# Patient Record
Sex: MALE | Race: OTHER | URBAN - METROPOLITAN AREA
[De-identification: names, ages, dates, MRNs, and addresses within clinical notes are randomized per-mention and may not be internally consistent; named-entity substitution may affect disease eponyms.]

---

## 2017-09-03 ENCOUNTER — EMERGENCY (EMERGENCY)
Facility: HOSPITAL | Age: 47
LOS: 1 days | Discharge: HOME | End: 2017-09-03
Attending: EMERGENCY MEDICINE | Admitting: EMERGENCY MEDICINE
Payer: COMMERCIAL

## 2017-09-03 VITALS
RESPIRATION RATE: 18 BRPM | TEMPERATURE: 98 F | OXYGEN SATURATION: 100 % | HEART RATE: 85 BPM | DIASTOLIC BLOOD PRESSURE: 82 MMHG | SYSTOLIC BLOOD PRESSURE: 122 MMHG

## 2017-09-03 VITALS
HEART RATE: 84 BPM | TEMPERATURE: 98 F | RESPIRATION RATE: 18 BRPM | WEIGHT: 205.03 LBS | OXYGEN SATURATION: 98 % | SYSTOLIC BLOOD PRESSURE: 138 MMHG | HEIGHT: 70 IN | DIASTOLIC BLOOD PRESSURE: 88 MMHG

## 2017-09-03 DIAGNOSIS — R10.84 GENERALIZED ABDOMINAL PAIN: ICD-10-CM

## 2017-09-03 DIAGNOSIS — Z88.8 ALLERGY STATUS TO OTHER DRUGS, MEDICAMENTS AND BIOLOGICAL SUBSTANCES: ICD-10-CM

## 2017-09-03 DIAGNOSIS — Z88.1 ALLERGY STATUS TO OTHER ANTIBIOTIC AGENTS STATUS: ICD-10-CM

## 2017-09-03 LAB
ALBUMIN SERPL ELPH-MCNC: 4.5 G/DL — SIGNIFICANT CHANGE UP (ref 3.3–5)
ALP SERPL-CCNC: 65 U/L — SIGNIFICANT CHANGE UP (ref 40–120)
ALT FLD-CCNC: 16 U/L — SIGNIFICANT CHANGE UP (ref 10–45)
ANION GAP SERPL CALC-SCNC: 14 MMOL/L — SIGNIFICANT CHANGE UP (ref 5–17)
AST SERPL-CCNC: 19 U/L — SIGNIFICANT CHANGE UP (ref 10–40)
BASOPHILS NFR BLD AUTO: 0.3 % — SIGNIFICANT CHANGE UP (ref 0–2)
BILIRUB SERPL-MCNC: 0.3 MG/DL — SIGNIFICANT CHANGE UP (ref 0.2–1.2)
BUN SERPL-MCNC: 12 MG/DL — SIGNIFICANT CHANGE UP (ref 7–23)
CALCIUM SERPL-MCNC: 9 MG/DL — SIGNIFICANT CHANGE UP (ref 8.4–10.5)
CHLORIDE SERPL-SCNC: 98 MMOL/L — SIGNIFICANT CHANGE UP (ref 96–108)
CO2 SERPL-SCNC: 25 MMOL/L — SIGNIFICANT CHANGE UP (ref 22–31)
CREAT SERPL-MCNC: 0.9 MG/DL — SIGNIFICANT CHANGE UP (ref 0.5–1.3)
EOSINOPHIL NFR BLD AUTO: 0.6 % — SIGNIFICANT CHANGE UP (ref 0–6)
GLUCOSE SERPL-MCNC: 102 MG/DL — HIGH (ref 70–99)
HCT VFR BLD CALC: 41 % — SIGNIFICANT CHANGE UP (ref 39–50)
HGB BLD-MCNC: 14.2 G/DL — SIGNIFICANT CHANGE UP (ref 13–17)
LIDOCAIN IGE QN: 22 U/L — SIGNIFICANT CHANGE UP (ref 7–60)
LYMPHOCYTES # BLD AUTO: 38 % — SIGNIFICANT CHANGE UP (ref 13–44)
MCHC RBC-ENTMCNC: 29.5 PG — SIGNIFICANT CHANGE UP (ref 27–34)
MCHC RBC-ENTMCNC: 34.6 G/DL — SIGNIFICANT CHANGE UP (ref 32–36)
MCV RBC AUTO: 85.2 FL — SIGNIFICANT CHANGE UP (ref 80–100)
MONOCYTES NFR BLD AUTO: 11.9 % — SIGNIFICANT CHANGE UP (ref 2–14)
NEUTROPHILS NFR BLD AUTO: 49.2 % — SIGNIFICANT CHANGE UP (ref 43–77)
PLATELET # BLD AUTO: 243 K/UL — SIGNIFICANT CHANGE UP (ref 150–400)
POTASSIUM SERPL-MCNC: 4 MMOL/L — SIGNIFICANT CHANGE UP (ref 3.5–5.3)
POTASSIUM SERPL-SCNC: 4 MMOL/L — SIGNIFICANT CHANGE UP (ref 3.5–5.3)
PROT SERPL-MCNC: 7.9 G/DL — SIGNIFICANT CHANGE UP (ref 6–8.3)
RBC # BLD: 4.81 M/UL — SIGNIFICANT CHANGE UP (ref 4.2–5.8)
RBC # FLD: 12.5 % — SIGNIFICANT CHANGE UP (ref 10.3–16.9)
SODIUM SERPL-SCNC: 137 MMOL/L — SIGNIFICANT CHANGE UP (ref 135–145)
WBC # BLD: 6.3 K/UL — SIGNIFICANT CHANGE UP (ref 3.8–10.5)
WBC # FLD AUTO: 6.3 K/UL — SIGNIFICANT CHANGE UP (ref 3.8–10.5)

## 2017-09-03 PROCEDURE — 83690 ASSAY OF LIPASE: CPT

## 2017-09-03 PROCEDURE — 99284 EMERGENCY DEPT VISIT MOD MDM: CPT

## 2017-09-03 PROCEDURE — 96375 TX/PRO/DX INJ NEW DRUG ADDON: CPT

## 2017-09-03 PROCEDURE — 80053 COMPREHEN METABOLIC PANEL: CPT

## 2017-09-03 PROCEDURE — 96374 THER/PROPH/DIAG INJ IV PUSH: CPT

## 2017-09-03 PROCEDURE — 85025 COMPLETE CBC W/AUTO DIFF WBC: CPT

## 2017-09-03 PROCEDURE — 99284 EMERGENCY DEPT VISIT MOD MDM: CPT | Mod: 25

## 2017-09-03 RX ORDER — SODIUM CHLORIDE 9 MG/ML
1000 INJECTION INTRAMUSCULAR; INTRAVENOUS; SUBCUTANEOUS ONCE
Qty: 0 | Refills: 0 | Status: COMPLETED | OUTPATIENT
Start: 2017-09-03 | End: 2017-09-03

## 2017-09-03 RX ORDER — LIDOCAINE 4 G/100G
10 CREAM TOPICAL ONCE
Qty: 0 | Refills: 0 | Status: COMPLETED | OUTPATIENT
Start: 2017-09-03 | End: 2017-09-03

## 2017-09-03 RX ORDER — FAMOTIDINE 10 MG/ML
20 INJECTION INTRAVENOUS ONCE
Qty: 0 | Refills: 0 | Status: COMPLETED | OUTPATIENT
Start: 2017-09-03 | End: 2017-09-03

## 2017-09-03 RX ORDER — ONDANSETRON 8 MG/1
4 TABLET, FILM COATED ORAL ONCE
Qty: 0 | Refills: 0 | Status: COMPLETED | OUTPATIENT
Start: 2017-09-03 | End: 2017-09-03

## 2017-09-03 RX ADMIN — ONDANSETRON 104 MILLIGRAM(S): 8 TABLET, FILM COATED ORAL at 15:14

## 2017-09-03 RX ADMIN — Medication 10 MILLILITER(S): at 15:13

## 2017-09-03 RX ADMIN — LIDOCAINE 10 MILLILITER(S): 4 CREAM TOPICAL at 15:13

## 2017-09-03 RX ADMIN — SODIUM CHLORIDE 2000 MILLILITER(S): 9 INJECTION INTRAMUSCULAR; INTRAVENOUS; SUBCUTANEOUS at 15:11

## 2017-09-03 RX ADMIN — FAMOTIDINE 20 MILLIGRAM(S): 10 INJECTION INTRAVENOUS at 15:13

## 2017-09-03 RX ADMIN — Medication 30 MILLILITER(S): at 15:13

## 2017-09-03 NOTE — ED PROVIDER NOTE - OBJECTIVE STATEMENT
46 yo male with hx of "bleeding ulcers" seventeen years ago presents with crampy, colicky abd pain, mild constipation and yani colored stools X several days. Pt states he thought it was "viral" and sxs got a little better yesterday but today while at work pt started to have severe crampy abd pain and so presents to ED for evaluation. Pt takes PPI as needed and is followed by GI with a colonoscopy and endoscopy earlier this year which he reports was negative (no ulcers. No fevers/ chills/ vomiting/ CP/SOB/ urinary sxs. No other complaints.

## 2017-09-03 NOTE — ED ADULT NURSE NOTE - CHPI ED SYMPTOMS NEG
no vomiting/no hematuria/no blood in stool/no fever/no abdominal distension/no dysuria/no burning urination/no diarrhea/no nausea/no chills

## 2017-09-03 NOTE — ED PROVIDER NOTE - PROGRESS NOTE DETAILS
post meds. Pt resting comfortably and without any complaints. labs/ studies noted. Repeat abd exam: soft/NT.

## 2017-09-07 ENCOUNTER — EMERGENCY (EMERGENCY)
Facility: HOSPITAL | Age: 47
LOS: 1 days | Discharge: HOME | End: 2017-09-07
Attending: EMERGENCY MEDICINE | Admitting: EMERGENCY MEDICINE
Payer: COMMERCIAL

## 2017-09-07 VITALS
DIASTOLIC BLOOD PRESSURE: 83 MMHG | RESPIRATION RATE: 18 BRPM | TEMPERATURE: 98 F | SYSTOLIC BLOOD PRESSURE: 121 MMHG | OXYGEN SATURATION: 97 % | HEART RATE: 87 BPM

## 2017-09-07 VITALS
RESPIRATION RATE: 16 BRPM | DIASTOLIC BLOOD PRESSURE: 81 MMHG | SYSTOLIC BLOOD PRESSURE: 131 MMHG | OXYGEN SATURATION: 100 % | HEART RATE: 77 BPM

## 2017-09-07 DIAGNOSIS — Z88.1 ALLERGY STATUS TO OTHER ANTIBIOTIC AGENTS STATUS: ICD-10-CM

## 2017-09-07 DIAGNOSIS — J34.0 ABSCESS, FURUNCLE AND CARBUNCLE OF NOSE: ICD-10-CM

## 2017-09-07 DIAGNOSIS — T36.8X5A ADVERSE EFFECT OF OTHER SYSTEMIC ANTIBIOTICS, INITIAL ENCOUNTER: ICD-10-CM

## 2017-09-07 DIAGNOSIS — K13.0 DISEASES OF LIPS: ICD-10-CM

## 2017-09-07 DIAGNOSIS — R20.2 PARESTHESIA OF SKIN: ICD-10-CM

## 2017-09-07 PROCEDURE — 96375 TX/PRO/DX INJ NEW DRUG ADDON: CPT

## 2017-09-07 PROCEDURE — 99284 EMERGENCY DEPT VISIT MOD MDM: CPT | Mod: 25

## 2017-09-07 PROCEDURE — 99284 EMERGENCY DEPT VISIT MOD MDM: CPT

## 2017-09-07 PROCEDURE — 96374 THER/PROPH/DIAG INJ IV PUSH: CPT

## 2017-09-07 RX ORDER — FLUCONAZOLE 150 MG/1
1 TABLET ORAL
Qty: 1 | Refills: 0 | OUTPATIENT
Start: 2017-09-07

## 2017-09-07 RX ORDER — DIPHENHYDRAMINE HCL 50 MG
50 CAPSULE ORAL ONCE
Qty: 0 | Refills: 0 | Status: COMPLETED | OUTPATIENT
Start: 2017-09-07 | End: 2017-09-07

## 2017-09-07 RX ORDER — FAMOTIDINE 10 MG/ML
20 INJECTION INTRAVENOUS ONCE
Qty: 0 | Refills: 0 | Status: COMPLETED | OUTPATIENT
Start: 2017-09-07 | End: 2017-09-07

## 2017-09-07 RX ORDER — EPINEPHRINE 0.3 MG/.3ML
0.3 INJECTION INTRAMUSCULAR; SUBCUTANEOUS
Qty: 1 | Refills: 0 | OUTPATIENT
Start: 2017-09-07

## 2017-09-07 RX ORDER — DIPHENHYDRAMINE HCL 50 MG
1 CAPSULE ORAL
Qty: 20 | Refills: 0 | OUTPATIENT
Start: 2017-09-07

## 2017-09-07 RX ORDER — SODIUM CHLORIDE 9 MG/ML
1000 INJECTION INTRAMUSCULAR; INTRAVENOUS; SUBCUTANEOUS ONCE
Qty: 0 | Refills: 0 | Status: COMPLETED | OUTPATIENT
Start: 2017-09-07 | End: 2017-09-07

## 2017-09-07 RX ADMIN — Medication 50 MILLIGRAM(S): at 16:42

## 2017-09-07 RX ADMIN — SODIUM CHLORIDE 2000 MILLILITER(S): 9 INJECTION INTRAMUSCULAR; INTRAVENOUS; SUBCUTANEOUS at 16:45

## 2017-09-07 RX ADMIN — Medication 450 MILLIGRAM(S): at 18:58

## 2017-09-07 RX ADMIN — FAMOTIDINE 20 MILLIGRAM(S): 10 INJECTION INTRAVENOUS at 16:42

## 2017-09-07 RX ADMIN — Medication 125 MILLIGRAM(S): at 16:42

## 2017-09-07 NOTE — ED PROVIDER NOTE - CARE PLAN
Principal Discharge DX:	Allergic reaction to drug, initial encounter Principal Discharge DX:	Allergic reaction to drug, initial encounter  Secondary Diagnosis:	Cellulitis

## 2017-09-07 NOTE — ED ADULT NURSE REASSESSMENT NOTE - NS ED NURSE REASSESS COMMENT FT1
Patient reports feeling better. Redness appears to have improved. Safety precautions maintained. Will continue to monitor.

## 2017-09-07 NOTE — ED ADULT NURSE NOTE - DISCHARGE TEACHING
followup with primary doctor. Take medications as prescribed. Return to ED if symptoms continue or worsen

## 2017-09-07 NOTE — ED PROVIDER NOTE - PHYSICAL EXAMINATION
GEN: Well appearing, well nourished, awake, alert, oriented to person, place, time/situation and in no apparent distress.  ENT: Airway patent, Nasal mucosa clear. Mouth with normal mucosa. +lip swelling noted. No tongue or oropharyngeal swelling, no stridor.  EYES: Clear bilaterally.  RESPIRATORY: Breathing comfortably with normal RR. No wheezes.  CARDIAC: Regular rate and rhythm  ABDOMEN: Soft, nontender, +bowel sounds, no rebound, rigidity, or guarding.  MSK: Range of motion is not limited, no deformities noted.  NEURO: Alert and oriented, no focal deficits.  SKIN: Skin normal color for race, warm, dry and intact. +urticarial rash to left inner forearm. Swollen lips.  PSYCH: Alert and oriented to person, place, time/situation. normal mood and affect. no apparent risk to self or others. GEN: Well appearing, well nourished, awake, alert, oriented to person, place, time/situation and in no apparent distress.  ENT: Airway patent, Nasal mucosa clear. Mouth with normal mucosa. +lip swelling noted. No tongue or oropharyngeal swelling, no stridor.  EYES: Clear bilaterally.  RESPIRATORY: Breathing comfortably with normal RR. No wheezes.  CARDIAC: Regular rate and rhythm  ABDOMEN: Soft, nontender, +bowel sounds, no rebound, rigidity, or guarding.  MSK: Range of motion is not limited, no deformities noted.  NEURO: Alert and oriented, no focal deficits.  SKIN: Skin normal color for race, warm, dry and intact. +urticarial rash to left inner forearm. Swollen lips. +erythema and warmth noted lateral to right nare, no abscess, fluctuance, crepitus, or gangrene.  PSYCH: Alert and oriented to person, place, time/situation. normal mood and affect. no apparent risk to self or others.

## 2017-09-07 NOTE — ED PROVIDER NOTE - MEDICAL DECISION MAKING DETAILS
47M with moderate allergic reaction, likely to drug (bactrim and/or bactroban), no resp distress. Will treat with IV benadryl, pepcid, solumendrol, IVFs and reassess. STOP bactrim and will rx doxy for cellulitis, which patient has had in the past. 47M with moderate allergic reaction, likely to drug (bactrim and/or Bactroban), no resp distress. Will treat with IV benadryl, pepcid, solumedrol, IVFs and reassess. STOP bactrim and will rx doxy for cellulitis, which patient has had in the past.

## 2017-09-07 NOTE — ED PROVIDER NOTE - PROGRESS NOTE DETAILS
Lips swelling still present but improved. Pt no longer has throat tightness and no SOB. He is anxious to go home. Will DC with Rx for Clinda for possible MRSA skin infection. Pt told to STOP bactrim and bactroban.   Will also rx diflucan as pt with h/o fungal rash to penis with abx in the past. Allergic reaction meds Rx'd to pt's pharmacy. Pt advised to call 911 or return asap for worsening allegric reaction sx. Also to return to ER if facial cellulitis worsens as he may need IV abx if fails PO treatment.

## 2017-09-07 NOTE — ED ADULT NURSE NOTE - OBJECTIVE STATEMENT
Patient presents to ED with c/o possible allergic reaction to bactrim. Reports symptoms started 11am this morning. Patient reports "I had a pimple by my nose and I went to [facility] and they prescribed my bactrim." Patient noted to have lip swelling. Denies any difficulty swallowing or breathing. Reports itching to wrists, no rash noted. Patient reports throat pain. Speaking in full sentences. Upgraded to MD Becerra. Safety precautions maintained. Will continue to monitor. Patient presents to ED with c/o possible allergic reaction to bactrim. Reports symptoms started 12pm this morning. Patient reports "I had a pimple by my nose and I went to [facility] and they prescribed my bactrim." Patient noted to have lip swelling and redness around lips and nose, redness noted around bilateral eyes.  Denies any difficulty swallowing or breathing. Reports itching to wrists, no rash noted. Patient reports throat pain. Speaking in full sentences. Upgraded to MD Becerra. Safety precautions maintained. Will continue to monitor.

## 2017-09-07 NOTE — ED PROVIDER NOTE - OBJECTIVE STATEMENT
47M who p/w allergic reaction after taking bactrim PO and Bactroban at 11am for nasal cellulitis dx at  yesterday. Pt states at 12pm he started feeling tingling and swelling in his lips associated with scratchy feeling in his throat. Also notes itchiness to left arm. No n/v, no CP/SOB. Denies h/o allergic reaction in the past.

## 2017-09-07 NOTE — ED ADULT TRIAGE NOTE - CHIEF COMPLAINT QUOTE
Patient c/o lip swelling ,eye swelling and feels his throat is tight started at 12nn . Pt. went to urgent care yesterday for nose infection , started the first dose of bactrim ds this morning at 11am together with  mupirocin ointment applied on the nostril . Denies any  chest pain nor sob .

## 2017-09-08 RX ORDER — FAMOTIDINE 10 MG/ML
1 INJECTION INTRAVENOUS
Qty: 8 | Refills: 0 | OUTPATIENT
Start: 2017-09-08 | End: 2017-09-12

## 2017-09-10 ENCOUNTER — EMERGENCY (EMERGENCY)
Facility: HOSPITAL | Age: 47
LOS: 1 days | Discharge: HOME | End: 2017-09-10
Attending: EMERGENCY MEDICINE | Admitting: EMERGENCY MEDICINE
Payer: COMMERCIAL

## 2017-09-10 VITALS
DIASTOLIC BLOOD PRESSURE: 76 MMHG | SYSTOLIC BLOOD PRESSURE: 123 MMHG | RESPIRATION RATE: 18 BRPM | TEMPERATURE: 98 F | OXYGEN SATURATION: 95 % | HEART RATE: 62 BPM

## 2017-09-10 VITALS
WEIGHT: 199.96 LBS | HEIGHT: 70 IN | HEART RATE: 83 BPM | OXYGEN SATURATION: 98 % | DIASTOLIC BLOOD PRESSURE: 90 MMHG | SYSTOLIC BLOOD PRESSURE: 130 MMHG | RESPIRATION RATE: 18 BRPM | TEMPERATURE: 98 F

## 2017-09-10 DIAGNOSIS — R07.0 PAIN IN THROAT: ICD-10-CM

## 2017-09-10 DIAGNOSIS — Z88.1 ALLERGY STATUS TO OTHER ANTIBIOTIC AGENTS STATUS: ICD-10-CM

## 2017-09-10 DIAGNOSIS — A64 UNSPECIFIED SEXUALLY TRANSMITTED DISEASE: ICD-10-CM

## 2017-09-10 DIAGNOSIS — Z79.899 OTHER LONG TERM (CURRENT) DRUG THERAPY: ICD-10-CM

## 2017-09-10 DIAGNOSIS — Z79.2 LONG TERM (CURRENT) USE OF ANTIBIOTICS: ICD-10-CM

## 2017-09-10 DIAGNOSIS — Z79.52 LONG TERM (CURRENT) USE OF SYSTEMIC STEROIDS: ICD-10-CM

## 2017-09-10 LAB
ALBUMIN SERPL ELPH-MCNC: 3.7 G/DL — SIGNIFICANT CHANGE UP (ref 3.3–5)
ALP SERPL-CCNC: 55 U/L — SIGNIFICANT CHANGE UP (ref 40–120)
ALT FLD-CCNC: 16 U/L — SIGNIFICANT CHANGE UP (ref 10–45)
ANION GAP SERPL CALC-SCNC: 10 MMOL/L — SIGNIFICANT CHANGE UP (ref 5–17)
APPEARANCE UR: CLEAR — SIGNIFICANT CHANGE UP
AST SERPL-CCNC: 19 U/L — SIGNIFICANT CHANGE UP (ref 10–40)
BASOPHILS NFR BLD AUTO: 0.1 % — SIGNIFICANT CHANGE UP (ref 0–2)
BILIRUB SERPL-MCNC: 0.4 MG/DL — SIGNIFICANT CHANGE UP (ref 0.2–1.2)
BILIRUB UR-MCNC: NEGATIVE — SIGNIFICANT CHANGE UP
BUN SERPL-MCNC: 13 MG/DL — SIGNIFICANT CHANGE UP (ref 7–23)
CALCIUM SERPL-MCNC: 9 MG/DL — SIGNIFICANT CHANGE UP (ref 8.4–10.5)
CHLORIDE SERPL-SCNC: 102 MMOL/L — SIGNIFICANT CHANGE UP (ref 96–108)
CO2 SERPL-SCNC: 27 MMOL/L — SIGNIFICANT CHANGE UP (ref 22–31)
COLOR SPEC: YELLOW — SIGNIFICANT CHANGE UP
CREAT SERPL-MCNC: 0.9 MG/DL — SIGNIFICANT CHANGE UP (ref 0.5–1.3)
DIFF PNL FLD: NEGATIVE — SIGNIFICANT CHANGE UP
EOSINOPHIL NFR BLD AUTO: 0.2 % — SIGNIFICANT CHANGE UP (ref 0–6)
GLUCOSE SERPL-MCNC: 93 MG/DL — SIGNIFICANT CHANGE UP (ref 70–99)
GLUCOSE UR QL: NEGATIVE — SIGNIFICANT CHANGE UP
HCT VFR BLD CALC: 39.4 % — SIGNIFICANT CHANGE UP (ref 39–50)
HGB BLD-MCNC: 13.4 G/DL — SIGNIFICANT CHANGE UP (ref 13–17)
KETONES UR-MCNC: NEGATIVE — SIGNIFICANT CHANGE UP
LEUKOCYTE ESTERASE UR-ACNC: NEGATIVE — SIGNIFICANT CHANGE UP
LYMPHOCYTES # BLD AUTO: 35.2 % — SIGNIFICANT CHANGE UP (ref 13–44)
MCHC RBC-ENTMCNC: 29.5 PG — SIGNIFICANT CHANGE UP (ref 27–34)
MCHC RBC-ENTMCNC: 34 G/DL — SIGNIFICANT CHANGE UP (ref 32–36)
MCV RBC AUTO: 86.8 FL — SIGNIFICANT CHANGE UP (ref 80–100)
MONOCYTES NFR BLD AUTO: 11.3 % — SIGNIFICANT CHANGE UP (ref 2–14)
NEUTROPHILS NFR BLD AUTO: 53.2 % — SIGNIFICANT CHANGE UP (ref 43–77)
NITRITE UR-MCNC: NEGATIVE — SIGNIFICANT CHANGE UP
PH UR: 6 — SIGNIFICANT CHANGE UP (ref 5–8)
PLATELET # BLD AUTO: 249 K/UL — SIGNIFICANT CHANGE UP (ref 150–400)
POTASSIUM SERPL-MCNC: 3.7 MMOL/L — SIGNIFICANT CHANGE UP (ref 3.5–5.3)
POTASSIUM SERPL-SCNC: 3.7 MMOL/L — SIGNIFICANT CHANGE UP (ref 3.5–5.3)
PROT SERPL-MCNC: 7.1 G/DL — SIGNIFICANT CHANGE UP (ref 6–8.3)
PROT UR-MCNC: NEGATIVE MG/DL — SIGNIFICANT CHANGE UP
RBC # BLD: 4.54 M/UL — SIGNIFICANT CHANGE UP (ref 4.2–5.8)
RBC # FLD: 13.3 % — SIGNIFICANT CHANGE UP (ref 10.3–16.9)
SODIUM SERPL-SCNC: 139 MMOL/L — SIGNIFICANT CHANGE UP (ref 135–145)
SP GR SPEC: <=1.005 — SIGNIFICANT CHANGE UP (ref 1–1.03)
UROBILINOGEN FLD QL: 0.2 E.U./DL — SIGNIFICANT CHANGE UP
WBC # BLD: 9.4 K/UL — SIGNIFICANT CHANGE UP (ref 3.8–10.5)
WBC # FLD AUTO: 9.4 K/UL — SIGNIFICANT CHANGE UP (ref 3.8–10.5)

## 2017-09-10 PROCEDURE — 86593 SYPHILIS TEST NON-TREP QUANT: CPT

## 2017-09-10 PROCEDURE — 36415 COLL VENOUS BLD VENIPUNCTURE: CPT

## 2017-09-10 PROCEDURE — 86592 SYPHILIS TEST NON-TREP QUAL: CPT

## 2017-09-10 PROCEDURE — 86695 HERPES SIMPLEX TYPE 1 TEST: CPT

## 2017-09-10 PROCEDURE — 81003 URINALYSIS AUTO W/O SCOPE: CPT

## 2017-09-10 PROCEDURE — 87529 HSV DNA AMP PROBE: CPT

## 2017-09-10 PROCEDURE — 99284 EMERGENCY DEPT VISIT MOD MDM: CPT

## 2017-09-10 PROCEDURE — 86696 HERPES SIMPLEX TYPE 2 TEST: CPT

## 2017-09-10 PROCEDURE — 80053 COMPREHEN METABOLIC PANEL: CPT

## 2017-09-10 PROCEDURE — 99283 EMERGENCY DEPT VISIT LOW MDM: CPT

## 2017-09-10 PROCEDURE — 87798 DETECT AGENT NOS DNA AMP: CPT

## 2017-09-10 PROCEDURE — 86780 TREPONEMA PALLIDUM: CPT

## 2017-09-10 PROCEDURE — 85025 COMPLETE CBC W/AUTO DIFF WBC: CPT

## 2017-09-10 RX ORDER — IBUPROFEN 200 MG
1 TABLET ORAL
Qty: 30 | Refills: 0 | OUTPATIENT
Start: 2017-09-10

## 2017-09-10 RX ORDER — VALACYCLOVIR 500 MG/1
1000 TABLET, FILM COATED ORAL ONCE
Qty: 0 | Refills: 0 | Status: COMPLETED | OUTPATIENT
Start: 2017-09-10 | End: 2017-09-10

## 2017-09-10 RX ORDER — VALACYCLOVIR 500 MG/1
1 TABLET, FILM COATED ORAL
Qty: 14 | Refills: 0 | OUTPATIENT
Start: 2017-09-10 | End: 2017-09-17

## 2017-09-10 RX ORDER — ACETAMINOPHEN 500 MG
650 TABLET ORAL ONCE
Qty: 0 | Refills: 0 | Status: COMPLETED | OUTPATIENT
Start: 2017-09-10 | End: 2017-09-10

## 2017-09-10 RX ORDER — SODIUM CHLORIDE 9 MG/ML
1000 INJECTION INTRAMUSCULAR; INTRAVENOUS; SUBCUTANEOUS
Qty: 0 | Refills: 0 | Status: DISCONTINUED | OUTPATIENT
Start: 2017-09-10 | End: 2017-09-14

## 2017-09-10 RX ORDER — IBUPROFEN 200 MG
600 TABLET ORAL ONCE
Qty: 0 | Refills: 0 | Status: COMPLETED | OUTPATIENT
Start: 2017-09-10 | End: 2017-09-10

## 2017-09-10 RX ADMIN — SODIUM CHLORIDE 125 MILLILITER(S): 9 INJECTION INTRAMUSCULAR; INTRAVENOUS; SUBCUTANEOUS at 14:07

## 2017-09-10 RX ADMIN — VALACYCLOVIR 1000 MILLIGRAM(S): 500 TABLET, FILM COATED ORAL at 12:48

## 2017-09-10 RX ADMIN — SODIUM CHLORIDE 125 MILLILITER(S): 9 INJECTION INTRAMUSCULAR; INTRAVENOUS; SUBCUTANEOUS at 13:55

## 2017-09-10 RX ADMIN — Medication 650 MILLIGRAM(S): at 11:24

## 2017-09-10 RX ADMIN — Medication 600 MILLIGRAM(S): at 13:55

## 2017-09-10 RX ADMIN — SODIUM CHLORIDE 125 MILLILITER(S): 9 INJECTION INTRAMUSCULAR; INTRAVENOUS; SUBCUTANEOUS at 12:37

## 2017-09-10 RX ADMIN — SODIUM CHLORIDE 125 MILLILITER(S): 9 INJECTION INTRAMUSCULAR; INTRAVENOUS; SUBCUTANEOUS at 11:47

## 2017-09-10 RX ADMIN — SODIUM CHLORIDE 125 MILLILITER(S): 9 INJECTION INTRAMUSCULAR; INTRAVENOUS; SUBCUTANEOUS at 12:48

## 2017-09-10 RX ADMIN — Medication 600 MILLIGRAM(S): at 11:24

## 2017-09-10 NOTE — ED PROVIDER NOTE - OBJECTIVE STATEMENT
46 yo male h/o mult drug allergies c/o allergic reaction.  Pt had cellulitis/pimple in his nose 5 d ago - went to [facility] and given bactrim.  Pt took 1 dose and started having allergic reaction sx w lip/throat sx so came to the ED 9/7.  Pt given steroids, antihistamine, pepcid and dc w same as well as changed to clindamycin despite his h/o allergic reaction.  Pt reports he told the md that he gets a "yeast infection" w augmentin and clinda but that the md felt it would be safe to use this medication.  Pt was given diflucan to prevent yeast but reports it did not prevent his sx.   Pt notes throat tightness and soreness after starting the clinda as well as eye irritation w/o redness but + discharge, mouth pain and a sore on upper, inner L lip as well as genital and rectal pain and "yeast" on his penis that is painful. Pt reports similar sx x weeks in the past after receiving this abx as well as augmentin.  Pt reports his throat sx are better today but the other sx feel worse.  Pt c/o pain w defecation and urination. Pt noted irritated skin R cheek as well.  Pt stopped taking all medications yest.  Pt reports his nose sx are much better w/o pain or redness of the skin anymore.  No abd pain, n/v/d, cp, palpitations, sob.

## 2017-09-10 NOTE — ED ADULT TRIAGE NOTE - OTHER COMPLAINTS
pt c.o yeast infection on penis, swelling to b/l eyes, rash to torso and pain to throat when swallowing. pt started on clindamycin on thursday. respirations even and unlabored, able to speak in full sentences without difficulty, denies sob.

## 2017-09-10 NOTE — ED PROVIDER NOTE - SKIN, MLM
Skin normal color for race, warm, dry and intact. R cheek near nasolabial fold w mild erythema, no ulceration, no other rash

## 2017-09-10 NOTE — ED ADULT NURSE NOTE - CHPI ED SYMPTOMS NEG
no cough/no shortness of breath/no swelling of face, tongue/no difficulty breathing/no wheezing/no nausea/no vomiting

## 2017-09-10 NOTE — CONSULT NOTE ADULT - ASSESSMENT
Suggest:  - HSV PCR of mouth and genital ulcer  - Syphilis screen, urine for GC, chlamydia Imp:  Presentation would be very unusual for Lynch-Enmanuel.   No fever, developed onset soon after receipt of one dose of TMP/SX.  Though he does have conjunctival injection, his oral lesion would be atypical - usually affects vermillion border.  His genital lesion also would be atypical, as SJS usually is associated with urethritis, not painful ulcer.  I am more concerned that his oral and genital lesions are HSV.  Would evaluate for other STDs, as well.  Suggest:  - HSV PCR of mouth and genital ulcer  - Syphilis screen, urine for GC, chlamydia, HSV 1/2 IgM and IgG, UA  - Valacyclovir 1 g po q12 h X 7 d  - No further antibacterial agents or fluconazole  - He was instructed to call me immediately if he worsens  - F/U with me as an outpatient - my office will call him to arrange appointment.  Will discuss repeat HIV testing at that time  Recommendations discussed with Dr. Souza.

## 2017-09-10 NOTE — ED PROVIDER NOTE - ENMT, MLM
Airway patent, Nasal mucosa clear. Mouth with small ulceration upper, inner L lip, op benign. Throat has no vesicles, no oropharyngeal exudates and uvula is midline.

## 2017-09-10 NOTE — ED ADULT NURSE NOTE - OBJECTIVE STATEMENT
patient alert and oriented x 3 came for allergic reaction from clindamycin , pt . was here last Thursday for the same reason . Today he is c/o bilateral eye swelling , burning sensation , rashes on the torso , throat pain , slight painful to swallow since yesterday and worse today with dryness on the penile area and burning sensation on the rectum. No wheezing noted . Denies any chest pain , palpitations  nor sob . Pt. took prednisone and benadryl last night . Not in distress . Will continue to monitor .

## 2017-09-10 NOTE — ED PROVIDER NOTE - PROGRESS NOTE DETAILS
Pt discussed w Dr Wiseman who will come to ed to [facility]. Pt eval by Dr Wiseman who is not concerned for sjs but is concerned about std- requests std testing, swabs to oral and penile lesion, and empiric rx w valtrex 1000 mg bid x 7; she will see him in [place] on Fri.

## 2017-09-10 NOTE — ED PROVIDER NOTE - MEDICAL DECISION MAKING DETAILS
Pt c/o pain in throat, mouth, genitals, eyes s/p abx - bactrim and clinda; h/o similar w clinda in the past.  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  Plan labs, ivf, pain control, will discuss w id.  Poss admit for observation and pain mgmt.

## 2017-09-10 NOTE — ED PROVIDER NOTE - EYES, MLM
Clear bilaterally, pupils equal, round and reactive to light. no conjunctival injection, + old crusting near lashes, eomi

## 2017-09-10 NOTE — CONSULT NOTE ADULT - SUBJECTIVE AND OBJECTIVE BOX
HPI:      PAST MEDICAL & SURGICAL HISTORY:  No pertinent past medical history  No significant past surgical history          MEDICATIONS  (STANDING):  sodium chloride 0.9%. 1000 milliLiter(s) (125 mL/Hr) IV Continuous <Continuous>    MEDICATIONS  (PRN):      Allergies    Augmentin (Other)  Bactrim (Hives)  clindamycin (Rash)  doxycycline (Other)    Intolerances        SOCIAL HISTORY:    FAMILY HISTORY:  Noncontributory      Vital Signs Last 24 Hrs  T(C): 36.9 (10 Sep 2017 09:26), Max: 36.9 (10 Sep 2017 09:26)  T(F): 98.4 (10 Sep 2017 09:26), Max: 98.4 (10 Sep 2017 09:26)  HR: 83 (10 Sep 2017 09:26) (83 - 83)  BP: 130/90 (10 Sep 2017 09:26) (130/90 - 130/90)  BP(mean): --  RR: 18 (10 Sep 2017 09:26) (18 - 18)  SpO2: 98% (10 Sep 2017 09:26) (98% - 98%)    PE:  WDWN in no distress  HEENT:  NC, PERRL, sclerae anicteric, conjunctivae moderately injected, EOMI.  Sinuses nontender, no nasal exudate.  Has pustule mucosa left inner lip  Neck:  Supple, no adenopathy  Lungs:  Clear to auscultation  Cor:  RRR, S1, S2, no murmur appreciated  Abd:  Symmetric, normoactive BS.  Soft, nontender, no masses, guarding or rebound.  Liver and spleen not enlarged  Genitalia:  Has ulcerated lesion border of shaft and glans  Extrem:  No cyanosis or edema  Skin:  Anterior thighs with symmetric patches of dark erythema    LABS:                        13.4   9.4   )-----------( 249      ( 10 Sep 2017 10:49 )             39.4     09-10    139  |  102  |  13  ----------------------------<  93  3.7   |  27  |  0.90    Ca    9.0      10 Sep 2017 10:49    TPro  7.1  /  Alb  3.7  /  TBili  0.4  /  DBili  x   /  AST  19  /  ALT  16  /  AlkPhos  55  09-10        RADIOLOGY & ADDITIONAL STUDIES: HPI:  48 yo M with possible adverse reaction to antibiotics.  ID consult for assistance with management.  Per pt, ~9 yrs ago, he had had intranasal infection with Staph.  On 9/6, he developed pain and pustule inside right nostril.  Went to [facility] MD - told him he had cellulitis.  Was given TMP/SX and an ointment.  On 9/7, he developed swelling of his lips immediately after taking first dose of TMP/SX.  Came to [facility] ED - was given benadryl, prednisone and clindamycin to replace TMP/SX, as well as fluconazole because he develops "yeast infection" after taking multiple antibiotics, described as painful ulcer along penile shaft. These take a long time to resolve.  On 9/8, he developed eye irritation and pain in his mouth on inner aspect of superior lip.  His throat began feeling tight/weird and he noticed loss of taste.  On 9/9, developed areas of sensitivity of skin with erythema, pruritis.  Last night, had painful bowel movement - described as "knifelike."  The areas of skin sensitivity are improved today compared to yesterday.  His intranasal pain has resolved.    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  No h/o genital HSV  Syphilis - ~7 yrs ago - treated with PCN injections X 3  Anal warts - removed age 17  GC - age 19  No significant past surgical history    MEDICATIONS  (STANDING):  sodium chloride 0.9%. 1000 milliLiter(s) (125 mL/Hr) IV Continuous <Continuous>    MEDICATIONS  (PRN):      Allergies    Augmentin (Other)  Bactrim (Hives)  clindamycin (Rash)  doxycycline (Other)    Intolerances    SOCIAL HISTORY:  Was born and raised in [place].  He is single, lives alone.  Works as an actor, as well as a  and .  No tobacco, rare alcohol, no recreational drugs.  Has sex with men, anal and oral receptive, last one month ago.  Always uses protection.  HIV tests every few months with PMD or at [facility] clinics.  No travel in over 2 years, no pets.    FAMILY HISTORY:  Noncontributory      Vital Signs Last 24 Hrs  T(C): 36.9 (10 Sep 2017 09:26), Max: 36.9 (10 Sep 2017 09:26)  T(F): 98.4 (10 Sep 2017 09:26), Max: 98.4 (10 Sep 2017 09:26)  HR: 83 (10 Sep 2017 09:26) (83 - 83)  BP: 130/90 (10 Sep 2017 09:26) (130/90 - 130/90)  BP(mean): --  RR: 18 (10 Sep 2017 09:26) (18 - 18)  SpO2: 98% (10 Sep 2017 09:26) (98% - 98%)    PE:  WDWN in no distress  HEENT:  NC, PERRL, sclerae anicteric, conjunctivae moderately injected, EOMI.  Sinuses nontender, no nasal exudate.  Has ~2 mm pustular/vesicular lesion on mucosal surface of left superior lip.  Pharynx is without erythema or exudate.  Neck:  Supple, no adenopathy  Lungs:  Clear to auscultation  Cor:  RRR, S1, S2, no murmur appreciated  Abd:  Symmetric, normoactive BS.  Soft, nontender, no masses, guarding or rebound.  Liver and spleen not enlarged.  No visible perirectal lesions  Genitalia:  Has ~2 cm ulcerated lesion border of shaft and glans  Extrem:  No cyanosis or edema.  No axillary adenopathy  Skin:  Anterior thighs with symmetric patches of dark erythema    LABS:                        13.4   9.4   )-----------( 249      ( 10 Sep 2017 10:49 )             39.4     09-10    139  |  102  |  13  ----------------------------<  93  3.7   |  27  |  0.90    Ca    9.0      10 Sep 2017 10:49    TPro  7.1  /  Alb  3.7  /  TBili  0.4  /  DBili  x   /  AST  19  /  ALT  16  /  AlkPhos  55  09-10        RADIOLOGY & ADDITIONAL STUDIES:  None

## 2017-09-11 LAB
HSV+VZV DNA SPEC QL NAA+PROBE: SIGNIFICANT CHANGE UP
HSV1 IGG SER-ACNC: 43.8 INDEX — HIGH
HSV1 IGG SERPL QL IA: POSITIVE
HSV2 IGG FLD-ACNC: 0.1 INDEX — SIGNIFICANT CHANGE UP
HSV2 IGG SERPL QL IA: NEGATIVE — SIGNIFICANT CHANGE UP
SPECIMEN SOURCE: SIGNIFICANT CHANGE UP
T PALLIDUM AB TITR SER: POSITIVE

## 2017-09-12 LAB
C TRACH RRNA SPEC QL NAA+PROBE: SIGNIFICANT CHANGE UP
HSV 1+2 IGM SER-IMP: <0.91 RATIO — SIGNIFICANT CHANGE UP (ref 0–0.9)
N GONORRHOEA RRNA SPEC QL NAA+PROBE: SIGNIFICANT CHANGE UP
SPECIMEN SOURCE: SIGNIFICANT CHANGE UP

## 2017-09-13 PROBLEM — Z00.00 ENCOUNTER FOR PREVENTIVE HEALTH EXAMINATION: Status: ACTIVE | Noted: 2017-09-13

## 2017-09-14 LAB — RPR SERPL-ACNC: SIGNIFICANT CHANGE UP

## 2017-09-15 ENCOUNTER — APPOINTMENT (OUTPATIENT)
Dept: INFECTIOUS DISEASE | Facility: CLINIC | Age: 47
End: 2017-09-15
Payer: MEDICAID

## 2017-09-15 VITALS
RESPIRATION RATE: 14 BRPM | HEIGHT: 70 IN | DIASTOLIC BLOOD PRESSURE: 74 MMHG | SYSTOLIC BLOOD PRESSURE: 122 MMHG | BODY MASS INDEX: 28.63 KG/M2 | HEART RATE: 86 BPM | WEIGHT: 200 LBS | OXYGEN SATURATION: 98 % | TEMPERATURE: 98.3 F

## 2017-09-15 DIAGNOSIS — Z86.19 PERSONAL HISTORY OF OTHER INFECTIOUS AND PARASITIC DISEASES: ICD-10-CM

## 2017-09-15 DIAGNOSIS — Z80.8 FAMILY HISTORY OF MALIGNANT NEOPLASM OF OTHER ORGANS OR SYSTEMS: ICD-10-CM

## 2017-09-15 DIAGNOSIS — A60.01 HERPESVIRAL INFECTION OF PENIS: ICD-10-CM

## 2017-09-15 PROCEDURE — 36415 COLL VENOUS BLD VENIPUNCTURE: CPT

## 2017-09-15 PROCEDURE — 99214 OFFICE O/P EST MOD 30 MIN: CPT | Mod: 25

## 2017-09-15 RX ORDER — OMEPRAZOLE 20 MG/1
20 CAPSULE, DELAYED RELEASE ORAL
Refills: 0 | Status: ACTIVE | COMMUNITY

## 2017-09-15 RX ORDER — VALACYCLOVIR 1 G/1
1 TABLET, FILM COATED ORAL
Refills: 0 | Status: ACTIVE | COMMUNITY

## 2017-09-15 RX ORDER — LORATADINE 10 MG/1
10 TABLET, ORALLY DISINTEGRATING ORAL
Refills: 0 | Status: ACTIVE | COMMUNITY

## 2017-09-18 LAB
HSV 1+2 IGG SER IA-IMP: NEGATIVE
HSV 1+2 IGG SER IA-IMP: POSITIVE
HSV1 IGG SER QL: 43.6 INDEX
HSV2 IGG SER QL: 0.08 INDEX

## 2017-09-20 LAB
RPR SER-TITR: SIGNIFICANT CHANGE UP
T PALLIDUM IGG SER QL IF: REACTIVE

## 2018-07-10 NOTE — ED PROVIDER NOTE - NS ED MD EM SELECTION
[id number] Detailed

## 2018-07-26 NOTE — ED ADULT TRIAGE NOTE - PRO INTERPRETER NEED 2
VOICEMAIL  1. Caller Name: Pt                      Call Back Number: [phone] (home)       2. Message: Patient called requesting a PT referral to [facility]. Patient also wanted Dr. Iraheta to know she will be having xrays done next week.     3. Patient approves office to leave a detailed voicemail/MyChart message: no     English

## 2021-08-04 ENCOUNTER — APPOINTMENT (OUTPATIENT)
Dept: ENDOCRINOLOGY | Facility: CLINIC | Age: 51
End: 2021-08-04
Payer: MEDICAID

## 2021-08-04 VITALS
HEIGHT: 70 IN | WEIGHT: 197 LBS | DIASTOLIC BLOOD PRESSURE: 86 MMHG | SYSTOLIC BLOOD PRESSURE: 137 MMHG | BODY MASS INDEX: 28.2 KG/M2 | HEART RATE: 71 BPM

## 2021-08-04 PROCEDURE — 99203 OFFICE O/P NEW LOW 30 MIN: CPT

## 2021-08-04 PROCEDURE — 99205 OFFICE O/P NEW HI 60 MIN: CPT

## 2021-08-06 NOTE — REVIEW OF SYSTEMS
[Erectile Dysfunction] : erectile dysfunction [Decreased Libido] : decreased libido [Headaches] : headaches [Negative] : Heme/Lymph [Recent Weight Gain (___ Lbs)] : no recent weight gain [Recent Weight Loss (___ Lbs)] : no recent weight loss [Visual Disturbances] : no visual disturbances [Polyuria] : no polyuria

## 2021-08-06 NOTE — END OF VISIT
[FreeTextEntry3] : All medical record entries made by the Scribe were at my, Dr. Calos Wilson, direction and personally dictated by me on 08/04/2021. I have reviewed the chart and agree that the record accurately reflects my personal performance of the history, physical exam, assessment and plan. I have also personally directed, reviewed and agreed with the chart.  [Time Spent: ___ minutes] : I have spent [unfilled] minutes of time on the encounter.

## 2021-08-06 NOTE — PHYSICAL EXAM
[Alert] : alert [Normal Sclera/Conjunctiva] : normal sclera/conjunctiva [Normal Outer Ear/Nose] : the ears and nose were normal in appearance [No Neck Mass] : no neck mass was observed [No Respiratory Distress] : no respiratory distress [Normal S1, S2] : normal S1 and S2 [No Edema] : no peripheral edema [Spine Straight] : spine straight [Normal Gait] : normal gait [No Rash] : no rash [Normal Reflexes] : deep tendon reflexes were 2+ and symmetric [Oriented x3] : oriented to person, place, and time [de-identified] : no gyncecomastia

## 2021-08-06 NOTE — ADDENDUM
[FreeTextEntry1] : I, Quita Mcgarry, acted solely as a scribe for Dr. Calos Wilson on this date. 08/04/2021.

## 2021-08-06 NOTE — REASON FOR VISIT
[Initial Evaluation] : an initial evaluation [Hypogonadism] : hypogonadism [FreeTextEntry2] : Dr. Helen Chambers

## 2021-08-06 NOTE — CONSULT LETTER
[Dear  ___] : Dear  [unfilled], [Consult Letter:] : I had the pleasure of evaluating your patient, [unfilled]. [Sincerely,] : Sincerely, [FreeTextEntry3] : Calos Wilson

## 2021-08-06 NOTE — ASSESSMENT
[FreeTextEntry1] : 50 y/o M pt with:\par \par 1. Hypogonadism/ Low normal total testosterone:\par Hx of abnormal PSA. He sees urologist on regular basis. \par Pt is very active. He is involved in physical activity and goes to gym almost daily. He has good energy level and stamina. Denies Hx of MVA, endocrine disorder and MEN. \par PE: No gynecomastia. Testes exam are normal. \par Causes of low normal testosterone explained to pt.\par Ordered HPG axis test. \par \par Return in: 4 weeks

## 2021-08-19 ENCOUNTER — TRANSCRIPTION ENCOUNTER (OUTPATIENT)
Age: 51
End: 2021-08-19

## 2021-08-25 ENCOUNTER — NON-APPOINTMENT (OUTPATIENT)
Age: 51
End: 2021-08-25

## 2021-08-25 ENCOUNTER — APPOINTMENT (OUTPATIENT)
Dept: ENDOCRINOLOGY | Facility: CLINIC | Age: 51
End: 2021-08-25
Payer: MEDICAID

## 2021-08-25 VITALS
WEIGHT: 195 LBS | HEART RATE: 73 BPM | SYSTOLIC BLOOD PRESSURE: 131 MMHG | DIASTOLIC BLOOD PRESSURE: 87 MMHG | HEIGHT: 70 IN | BODY MASS INDEX: 27.92 KG/M2

## 2021-08-25 DIAGNOSIS — E29.1 TESTICULAR HYPOFUNCTION: ICD-10-CM

## 2021-08-25 LAB
FSH SERPL-MCNC: 2.6 IU/L
LH SERPL-ACNC: 3.8 IU/L
PROLACTIN SERPL-MCNC: 10.7 NG/ML
SHBG-ESOTERIX: 35.7 NMOL/L
TESTOST SERPL-MCNC: 331 NG/DL

## 2021-08-25 PROCEDURE — 99213 OFFICE O/P EST LOW 20 MIN: CPT

## 2021-08-27 PROBLEM — E29.1 HYPOGONADISM MALE: Status: ACTIVE | Noted: 2021-08-04

## 2021-08-27 NOTE — HISTORY OF PRESENT ILLNESS
[FreeTextEntry1] : 50 y/o M pt, with Hx of hypogonadism. \par Other PMHx: Herniated disc (during college), Bleeding ulcer (2000), Staph (2008). \par Denies Hx of MVA, testicular trauma and infection, and STDs. \par FHx: Throat CA (sister). Denies FHx of brain tumor and thyroid disorder. \par SHx: Non smoker. No EtOH use. No recreational drug use. Works as an actor. Exercises almost daily for 1-2 hrs. \par Follows with urologist regularly for fluctuating PSA levels. \par Drug Allergies: Augmentin, Bactrim, Clindamycin, Doxycycline \par \par 08/04/2021\par Pt went to his PCP with complaints of low libido and and absence of nocturnal penile tumescence. Labs drawn showed low testosterone level and pt was advised to see an endocrinologist. \par Pt presents today with /86 and BMI 28.27, feeling relatively healthy, with c/o low libido and no nocturnal penile tumescence. He has noticed shrinking in size of testicles for past few months. He reports of HA sometimes which he assumes is due to sinusitis. \par Pt shaves every other day. \par Denies visual disturbances, polyuria, weight changes and gynecomastia. \par Denies Hx of marijuana, amphetamine, testosterone and antidepressants use. \par \par 08/25/2021\par Pt presents today with /87 and BMI 27.98 for lab reports and recommendations. He is feeling well with no acute complaints. \par He has lost 2 lbs in last 3 weeks. He has active lifestyle and has normal stamina\par \par Current Medications: Omeprazole 20 mg prn, Claritin D (allergies) \par \par Labs: \par - 08/04/21: Esoterix SHBG 35.7, FSH 2.6, LH 3.8, Prolactin 10.7, Total Testosterone 331.0\par - 06/17/21: Sex Hormone Binding Globulin 30, Free Testosterone 39.1 (46.0-224.0), Total Testosterone 287

## 2021-08-27 NOTE — ASSESSMENT
[FreeTextEntry1] : 52 y/o M pt with:\par \par 1. Low normal total testosterone:\par He has active lifestyle and normal stamina, with no symptoms or hypogonadism\par Laboratory work-up: [facility] hormonal studies normal on 8/4/21. Free, Total Testosterone and LH are normal. \par No need for testosterone supplementation\par Recommend follow up in 1 year. \par \par Return in: 1 year

## 2021-08-27 NOTE — ADDENDUM
[FreeTextEntry1] : I, Quita Mcgarry, acted solely as a scribe for Dr. Calos Wilson on this date. 08/25/2021.

## 2021-08-27 NOTE — END OF VISIT
[FreeTextEntry3] : All medical record entries made by the Scribe were at my, Dr. Calos Wilson, direction and personally dictated by me on 08/25/2021. I have reviewed the chart and agree that the record accurately reflects my personal performance of the history, physical exam, assessment and plan. I have also personally directed, reviewed and agreed with the chart.  [Time Spent: ___ minutes] : I have spent [unfilled] minutes of time on the encounter.

## 2021-08-27 NOTE — PHYSICAL EXAM
[Alert] : alert [Normal Sclera/Conjunctiva] : normal sclera/conjunctiva [Normal Outer Ear/Nose] : the ears and nose were normal in appearance [No Neck Mass] : no neck mass was observed [No Respiratory Distress] : no respiratory distress [Normal S1, S2] : normal S1 and S2 [No Edema] : no peripheral edema [Spine Straight] : spine straight [Normal Gait] : normal gait [No Rash] : no rash [Normal Reflexes] : deep tendon reflexes were 2+ and symmetric [Oriented x3] : oriented to person, place, and time [de-identified] : no gyncecomastia

## 2025-04-25 NOTE — ED PROVIDER NOTE - MEDICAL DECISION MAKING DETAILS
Pt presents with crampy abd pain. Labs/ studies WNL. Pt given GI cocktail with resolution of sxs. Findings discussed with pt and pt reassured. Repeat abd exam soft/NT. To f/up outpt. [Negative] : Genitourinary